# Patient Record
Sex: MALE | Race: WHITE | NOT HISPANIC OR LATINO | Employment: UNEMPLOYED | URBAN - METROPOLITAN AREA
[De-identification: names, ages, dates, MRNs, and addresses within clinical notes are randomized per-mention and may not be internally consistent; named-entity substitution may affect disease eponyms.]

---

## 2020-10-20 ENCOUNTER — HOSPITAL ENCOUNTER (OUTPATIENT)
Dept: URGENT CARE | Facility: CLINIC | Age: 20
Discharge: HOME | End: 2020-10-20
Attending: EMERGENCY MEDICINE

## 2020-11-18 ENCOUNTER — OFFICE VISIT CONVERTED (OUTPATIENT)
Dept: ORTHOPEDIC SURGERY | Facility: CLINIC | Age: 20
End: 2020-11-18
Attending: STUDENT IN AN ORGANIZED HEALTH CARE EDUCATION/TRAINING PROGRAM

## 2020-12-02 ENCOUNTER — OFFICE VISIT CONVERTED (OUTPATIENT)
Dept: ORTHOPEDIC SURGERY | Facility: CLINIC | Age: 20
End: 2020-12-02
Attending: STUDENT IN AN ORGANIZED HEALTH CARE EDUCATION/TRAINING PROGRAM

## 2021-05-10 NOTE — H&P
History and Physical      Patient Name: Wei Soliman   Patient ID: [id number]   Sex: Male   YOB: 2000    Primary Care Provider: STEPHANY Murray NP   Referring Provider: STEPHANY Murray NP    Visit Date: November 18, 2020    Provider: Wei Schumacher MD   Location: [facility]   Location Address: [address] [place] [address]  [id number]   Location Phone: [phone]          Chief Complaint  · left wrist pain      History Of Present Illness  Wei Soliman is a 20 year old /White male who presents today to [facility].      Wei presents to the office today for evaluation of his left wrist. He states approximately 3 weeks ago he was cutting some trees in his yard, throwing around the limbs, he experienced some pain about the radial aspect of his left wrist. He denies specific injury to the wrist. He noticed the pain progressed towards the end of the day. On the date of 10/20/2020, he presented to the urgent care, x-rays were obtained and revealed it was negative for fractures. He was told it is possibly an inflamed tendon. They placed him a thumb-spiked wrist brace but has not seen much benefit from it. He was given naproxen but seen no benefit from it. He denies prior injections. He denies previous physical therapy or surgeries to the wrist in the past. He works in [email or web address].  He has been working with a wrist brace in place and tolerating it thus far. He is right-hand dominant.                  Past Medical History  *No Pertinent Past Medical History         Past Surgical History  Colonoscopy; Hernia         Allergy List  NO KNOWN DRUG ALLERGIES       Allergies Reconciled  Family Medical History  - No Family History of Colorectal Cancer         Social History  Alcohol (Never); Alcohol Use (Never); lives alone; Recreational Drug Use (Never); Single.; Tobacco (Former); Working         Review of Systems  · Constitutional  o Denies  o : fever, chills, weight  "loss  · Cardiovascular  o Denies  o : chest pain, shortness of breath  · Gastrointestinal  o Denies  o : liver disease, heartburn, nausea, blood in stools  · Genitourinary  o Denies  o : painful urination, blood in urine  · Integument  o Denies  o : rash, itching  · Neurologic  o Denies  o : headache, weakness, loss of consciousness  · Musculoskeletal  o Admits  o : left wrist pain  · Psychiatric  o Denies  o : drug/alcohol addiction, anxiety, depression      Vitals  Date Time BP Position Site L\R Cuff Size HR RR TEMP (F) WT  HT  BMI kg/m2 BSA m2 O2 Sat FR L/min FiO2 HC       11/18/2020 08:45 /6 Sitting    80 - R   269lbs 6oz 5'  11\" 37.57 2.47 98 %            Physical Examination  · Constitutional  o Appearance  o : well developed, well-nourished, no obvious deformities present  · Head and Face  o Head  o :   § Inspection  § : normocephalic  o Face  o :   § Inspection  § : no facial lesions  · Eyes  o Conjunctivae  o : conjunctivae normal  o Sclerae  o : sclerae white  · Ears, Nose, Mouth and Throat  o Ears  o :   § External Ears  § : appearance within normal limits  § Hearing  § : intact  o Nose  o :   § External Nose  § : appearance normal  · Neck  o Inspection/Palpation  o : normal appearance  o Range of Motion  o : full range of motion  · Respiratory  o Respiratory Effort  o : breathing unlabored  o Inspection of Chest  o : normal appearance  o Auscultation of Lungs  o : no audible wheezing or rales  · Cardiovascular  o Heart  o : regular rate  · Gastrointestinal  o Abdominal Examination  o : soft and non-tender  · Skin and Subcutaneous Tissue  o General Inspection  o : intact, no rashes  · Psychiatric  o General  o : Alert and oriented x3  o Judgement and Insight  o : judgment and insight intact  o Mood and Affect  o : mood normal, affect appropriate  · Left Wrist  o Inspection  o : No scars. No deformities. No swelling. No bruising . No redness. Tenderness over the radial border of left wrist. Point " tenderness over the first dorsal compartment. No tenderness over radial snuffbox. No tenderness over the remaining radiocarpal joint. Demonstrates full thumb extension, but strength limited by pain. Thumb CMC, MCP, IP joints are stable to gentle varus and valgus stress. Positive Finklestein's testing with pain localizing to the first dorsal compartment and pain relief with release of the thumb. Sensation intact to light touch in the median, radial, and ulnar nerve distributions. Palpable radial pulse. Well perfused hand.  · Injection Note/Aspiration Note  o Site  o : left wrist   o Procedure  o : Procedure: After educating the patient on a first dorsal compartment injection, the patient gave consent for procedure. After using Chloraprep x 2, the first dorsal compartment was injected without complication. The needle was withdrawn in its entirety. A sterile bandage was applied. The patient tolerated the procedure well.  o Medication  o : 80 mg of DepoMedrol with 1cc of 1% Lidocaine          Assessment  · Left wrist pain     719.43/M25.532  · De Quevain's Tenosynovitis     727.00/M65.9      Plan  · Orders  o Depo-Medrol injection 80mg () - - 11/18/2020   Lot [id number] Exp 10 2021 [employer] Administered by Wei Schumacher MD  o Arthrocentesis of wrist (20605) - - 11/18/2020   Lot [id number] Exp 08 01 2021 [employer] Administered by Wei Schumacher  · Medications  o Medications have been Reconciled  o Transition of Care or Provider Policy  · Instructions  o X-rays reviewed by Dr. Schumacher.  o Reviewed the patient's Past Medical, Social, and Family history as well as the ROS at today's visit, no changes.  o Call or return if worsening symptoms.  o Exercise handout given.  o Follow Up in 2 weeks.  o The above service was scribed by Teto Spangler on my behalf and I attest to the accuracy of the note. cb  o Wei has left wrist de Quervain's. He has had symptoms for 3 weeks. This is not improved with bracing and  anti-inflammatory use. We discussed a first dorsal compartment injection today, and the patient elected to proceed. This was done without complication. He should continue the thumb spica brace at all times. He should limit his lifting and gripping activities as able. We discussed icing and continued anti-inflammatory use on a regular basis over the next 2 weeks. He will use the naproxen that he already has. He will follow-up with me in 2 weeks time to monitor his progress. We briefly discussed surgical management and will consider that if needed in the future.  o Electronically Identified Patient Education Materials Provided Electronically            Electronically Signed by: Wei Schumacher MD -Author on November 19, 2020 03:22:48 PM

## 2021-05-13 NOTE — PROGRESS NOTES
Progress Note      Patient Name: Wei Soliman   Patient ID: [id number]   Sex: Male   YOB: 2000    Primary Care Provider: STEPHANY Murray NP   Referring Provider: STEPHANY Murray NP    Visit Date: December 2, 2020    Provider: Wei Schumacher MD   Location: [facility]   Location Address: [address] [place] [address]  [id number]   Location Phone: [phone]          Chief Complaint  · left wrist pain      History Of Present Illness  Wei Soliman is a 20 year old /White male who presents today to [facility].      Wei presents to the office today for a follow up of his left wrist.  He has de Quervain's tenosynovitis.  In our previous visit we performed a first dorsal compartment injection and he has been wearing a wrist brace.  He reports significant improvement after the injection.  He is back to work and performing all of his duties.  He has been coming out of the brace more and more.  He has mild crepitus over the first dorsal compartment but no other concerns today.  He works in [employer]. He is right-hand dominant.                  Past Medical History  *No Pertinent Past Medical History         Past Surgical History  Colonoscopy; Hernia         Allergy List  NO KNOWN DRUG ALLERGIES       Allergies Reconciled  Family Medical History  - No Family History of Colorectal Cancer         Social History  Alcohol (Never); Alcohol Use (Never); lives alone; Recreational Drug Use (Never); Single.; Tobacco (Former); Working         Review of Systems  · Constitutional  o Denies  o : fever, chills, weight loss  · Cardiovascular  o Denies  o : chest pain, shortness of breath  · Gastrointestinal  o Denies  o : liver disease, heartburn, nausea, blood in stools  · Genitourinary  o Denies  o : painful urination, blood in urine  · Integument  o Denies  o : rash, itching  · Neurologic  o Denies  o : headache, weakness, loss of consciousness  · Musculoskeletal  o Admits  o : left wrist  "pain  · Psychiatric  o Denies  o : drug/alcohol addiction, anxiety, depression      Vitals  Date Time BP Position Site L\R Cuff Size HR RR TEMP (F) WT  HT  BMI kg/m2 BSA m2 O2 Sat FR L/min FiO2 HC       12/02/2020 09:11 AM      84 - R   272lbs 2oz 5'  11\" 37.95 2.49 97 %            Physical Examination  · Constitutional  o Appearance  o : well developed, well-nourished, no obvious deformities present  · Head and Face  o Head  o :   § Inspection  § : normocephalic  o Face  o :   § Inspection  § : no facial lesions  · Eyes  o Conjunctivae  o : conjunctivae normal  o Sclerae  o : sclerae white  · Ears, Nose, Mouth and Throat  o Ears  o :   § External Ears  § : appearance within normal limits  § Hearing  § : intact  o Nose  o :   § External Nose  § : appearance normal  · Neck  o Inspection/Palpation  o : normal appearance  o Range of Motion  o : full range of motion  · Respiratory  o Respiratory Effort  o : breathing unlabored  o Inspection of Chest  o : normal appearance  o Auscultation of Lungs  o : no audible wheezing or rales  · Cardiovascular  o Heart  o : regular rate  · Gastrointestinal  o Abdominal Examination  o : soft and non-tender  · Skin and Subcutaneous Tissue  o General Inspection  o : intact, no rashes  · Psychiatric  o General  o : Alert and oriented x3  o Judgement and Insight  o : judgment and insight intact  o Mood and Affect  o : mood normal, affect appropriate  · Left Wrist  o Inspection  o : No scars. No deformities. No swelling. No bruising . No redness. Non tender over the radial border of left wrist. Nontender over the first dorsal compartment. No tenderness over radial snuffbox. No tenderness over the remaining radiocarpal joint. Demonstrates full thumb extension. Nontender over A1 Pully or CMC joint. Thumb CMC, MCP, IP joints are stable to gentle Varus and valgus stress. Mild tenderness with Finklestein's testing but much improved since previous visit. Sensation intact to light touch in the " median, radial, and ulnar nerve distributions. Palpable radial pulse. Well perfused hand.           Assessment  · Left wrist pain     719.43/M25.532  · De Quervain's tenosynovitis     727.04/M65.4      Plan  · Medications  o Medications have been Reconciled  o Transition of Care or Provider Policy  · Instructions  o Reviewed the patient's Past Medical, Social, and Family history as well as the ROS at today's visit, no changes.  o Call or return if worsening symptoms.  o Follow Up PRN.  o The above service was scribed by Teto Spangler on my behalf and I attest to the accuracy of the note. cb  o eWi is much improved after an injection for left wrist de Quervain's tenosynovitis. We discussed continued conservative management. He should use the thumb spica brace as needed. He can take Aleve or use topical medications as needed for any pain. We discussed that repetitive lifting type activities may exacerbate his symptoms. He was understanding. He would like to follow-up as needed going forward            Electronically Signed by: Wei Schumacher MD -Author on December 4, 2020 12:30:34 PM

## 2021-05-14 VITALS
HEART RATE: 80 BPM | OXYGEN SATURATION: 98 % | DIASTOLIC BLOOD PRESSURE: 6 MMHG | BODY MASS INDEX: 37.71 KG/M2 | WEIGHT: 269.37 LBS | HEIGHT: 71 IN | SYSTOLIC BLOOD PRESSURE: 269 MMHG

## 2021-05-14 VITALS — WEIGHT: 272.12 LBS | HEART RATE: 84 BPM | BODY MASS INDEX: 38.1 KG/M2 | HEIGHT: 71 IN | OXYGEN SATURATION: 97 %

## 2021-07-28 ENCOUNTER — OFFICE VISIT (OUTPATIENT)
Dept: ORTHOPEDIC SURGERY | Facility: CLINIC | Age: 21
End: 2021-07-28

## 2021-07-28 VITALS — HEIGHT: 71 IN | BODY MASS INDEX: 35.7 KG/M2 | OXYGEN SATURATION: 97 % | WEIGHT: 255 LBS | HEART RATE: 83 BPM

## 2021-07-28 DIAGNOSIS — M65.4 RADIAL STYLOID TENOSYNOVITIS (DE QUERVAIN): Primary | ICD-10-CM

## 2021-07-28 DIAGNOSIS — M25.532 LEFT WRIST PAIN: ICD-10-CM

## 2021-07-28 PROCEDURE — 20605 DRAIN/INJ JOINT/BURSA W/O US: CPT | Performed by: STUDENT IN AN ORGANIZED HEALTH CARE EDUCATION/TRAINING PROGRAM

## 2021-07-28 RX ADMIN — LIDOCAINE HYDROCHLORIDE 1 ML: 10 INJECTION, SOLUTION INFILTRATION; PERINEURAL at 13:34

## 2021-07-28 RX ADMIN — METHYLPREDNISOLONE ACETATE 80 MG: 80 INJECTION, SUSPENSION INTRA-ARTICULAR; INTRALESIONAL; INTRAMUSCULAR; SOFT TISSUE at 13:34

## 2021-07-28 NOTE — PROGRESS NOTES
"Chief Complaint  Pain of the Left Thumb    Subjective          Wei Soliman presents to [facility] for   History of Present Illness    Wei returns today for an evaluation of left thumb pain. I previously saw him for left wrist de Quervain's syndrome.  He received an injection which completely relieved his pain initially.  States the pain came back about 1 month ago. No new injuries. No numbness. Currenty wearing wrsit brace.  He is still able to work with assistance.  He works in [employer].    No Known Allergies     Social History     Socioeconomic History   • Marital status: Single     Spouse name: Not on file   • Number of children: Not on file   • Years of education: Not on file   • Highest education level: Not on file   Tobacco Use   • Smoking status: Former Smoker   • Smokeless tobacco: Never Used   • Tobacco comment: smoked 3 years, less than 1/2 ppd    Vaping Use   • Vaping Use: Every day   • Substances: Flavoring   • Devices: Pre-filled or refillable cartridge   Substance and Sexual Activity   • Alcohol use: Never   • Drug use: Never        I reviewed the patient's chief complaint, history of present illness, review of systems, past medical history, surgical history, family history, social history, medications, and allergy list.     REVIEW OF SYSTEMS    Constitutional: Denies fevers, chills, weight loss  Cardiovascular: Denies chest pain, shortness of breath  Skin: Denies rashes, acute skin changes  Neurologic: Denies headache, loss of consciousness  MSK: left thumb pain      Objective   Vital Signs:   Pulse 83   Ht 180.3 cm (71\")   Wt 116 kg (255 lb)   SpO2 97%   BMI 35.57 kg/m²     Body mass index is 35.57 kg/m².    Physical Exam    General: Alert, no acute distress  Left upper extremity: tender over 1st dorsal compartment. Tender over radial aspect of wrist. Nontender over remaining wrist and fingers. Positive Finkelstein test. 60 degrees wrist flexion and extension.  " Sensation intact in the median, radial, ulnar nerve distributions.  Motor intact with AIN, PIN, ulnar nerve function.  Palpable radial pulse.    Small Joint Arthrocentesis  Consent given by: patient  Site marked: site marked  Timeout: Immediately prior to procedure a time out was called to verify the correct patient, procedure, equipment, support staff and site/side marked as required   Supporting Documentation  Indications: pain   Procedure Details  Location: LEFT WRIST.  Preparation: Patient was prepped and draped in the usual sterile fashion  Needle size: 25 G  Medications administered: 1 mL lidocaine 1 %; 80 mg methylPREDNISolone acetate 80 MG/ML  Patient tolerance: patient tolerated the procedure well with no immediate complications          Imaging Results (Most Recent)     None                   Assessment and Plan    Diagnoses and all orders for this visit:    1. Radial styloid tenosynovitis (de quervain) (Primary)    2. Left wrist pain    Other orders  -     Small Joint Arthrocentesis        Wei was previously diagnosed/treated for left wrist deQuervain syndrome. His symptoms have since recurred with no new injuries. Last visit he received a steroid injection that he stated helped. Discussed operative vs. nonoperative management.  Patient expressed understanding and wished to proceed with a repeat steroid injection. Discussed the risks and benefits with the patient. Patient tolerated injection well with no complications. Okay to continue with naproxen and brace wear.  Follow up prn.    Scribed for Wei Schumacher MD by Jenna Middleton MA.  07/28/21   13:00 EDT    Follow Up   Return if symptoms worsen or fail to improve.   Follow up prn  Patient was given instructions and counseling regarding his condition or for health maintenance advice. Please see specific information pulled into the AVS if appropriate.

## 2021-07-31 PROBLEM — M65.4 RADIAL STYLOID TENOSYNOVITIS (DE QUERVAIN): Status: ACTIVE | Noted: 2021-07-31

## 2021-07-31 RX ORDER — LIDOCAINE HYDROCHLORIDE 10 MG/ML
1 INJECTION, SOLUTION INFILTRATION; PERINEURAL
Status: COMPLETED | OUTPATIENT
Start: 2021-07-28 | End: 2021-07-28

## 2021-07-31 RX ORDER — METHYLPREDNISOLONE ACETATE 80 MG/ML
80 INJECTION, SUSPENSION INTRA-ARTICULAR; INTRALESIONAL; INTRAMUSCULAR; SOFT TISSUE
Status: COMPLETED | OUTPATIENT
Start: 2021-07-28 | End: 2021-07-28